# Patient Record
Sex: FEMALE | Race: WHITE | NOT HISPANIC OR LATINO | Employment: FULL TIME | URBAN - METROPOLITAN AREA
[De-identification: names, ages, dates, MRNs, and addresses within clinical notes are randomized per-mention and may not be internally consistent; named-entity substitution may affect disease eponyms.]

---

## 2023-12-13 NOTE — PROGRESS NOTES
INITIAL EVALUATION       HISTORY OF PRESENT ILLNESS:   Marissa Salas is a 33 y.o. female who presents as a new patient with pelvic pain. She has been diagnosed with endometriosis in the past. She has had multiple surgeries with Dr. Muñoz and does not want to see him again.    She is hoping to schedule surgery with Dr. Harris but they would not schedule her until she saw us.     She is not able to take OCP as they were giving her migraines. She has not yet tried any other type of hormonal medication to suppress the endometriosis.     She has not tried trigger point injections yet for her pelvic pain or pain with sex. She describes the pain as stabbing in nature. Deep penetration is the worst feeling for her. She does not have pain with insertion. She still feels this even after her hysterectomy. She is still able to stay aroused despite knowing there may be pain.     PAST MEDICAL HISTORY:  No past medical history on file.    PAST SURGICAL HISTORY:  No past surgical history on file.     ALLERGIES:   Not on File     MEDICATIONS:   [unfilled]     SOCIAL HISTORY:  Patient     Social History     Socioeconomic History    Marital status:      Spouse name: Not on file    Number of children: Not on file    Years of education: Not on file    Highest education level: Not on file   Occupational History    Not on file   Tobacco Use    Smoking status: Not on file    Smokeless tobacco: Not on file   Substance and Sexual Activity    Alcohol use: Not on file    Drug use: Not on file    Sexual activity: Not on file   Other Topics Concern    Not on file   Social History Narrative    Not on file     Social Determinants of Health     Financial Resource Strain: Not on file   Food Insecurity: Not on file   Transportation Needs: Not on file   Physical Activity: Not on file   Stress: Not on file   Social Connections: Not on file   Intimate Partner Violence: Not on file   Housing Stability: Not on file       FAMILY HISTORY:  No  family history on file.    REVIEW OF SYSTEMS:  Constitutional: Negative for fever and chills. Denies anorexia, weight loss.  Eyes: Negative for visual disturbance.   Respiratory: Negative for shortness of breath.    Cardiovascular: Negative for chest pain.   Gastrointestinal: Negative for nausea and vomiting.   Genitourinary: See interval history above.  Skin: Negative for rash.   Neurological: Negative for dizziness and numbness.   Psychiatric/Behavioral: Negative for confusion and decreased concentration.     PHYSICAL EXAM:  External female genitalia is normal    There are no lesions on vulva, labia major or the labia minora     The clitoral prepuce is normal     The urethra is also normal     Speculum exam done gently and cervix visualized, no friable tissue, cysts/lesions noted     Pelvic muscles are not tense overall. There is some tightness on the right side but no tenderness.     Assessment:      Marissa Salas is a 33 y.o. female with endometriosis    We will get her referred to Dr. Castellano who is sub specialized in [facility] for endometriosis.     We will follow up with her annually, or sooner if needed.      Plan:   -Prescribed Aygestin 2.5mg  -Prescribed Tens unit for endometrial pain per her request  -Referral to Dr. Castellano   -Recommended she and her  try the OhNut    Follow up in 1 year    Scribe Attestation  By signing my name below, IYuridia Scribe   attest that this documentation has been prepared under the direction and in the presence of Prema Raphael MD MPH.

## 2023-12-14 ENCOUNTER — OFFICE VISIT (OUTPATIENT)
Dept: UROLOGY | Facility: CLINIC | Age: 33
End: 2023-12-14
Payer: COMMERCIAL

## 2023-12-14 VITALS
HEIGHT: 66 IN | HEART RATE: 84 BPM | WEIGHT: 138.4 LBS | TEMPERATURE: 98 F | DIASTOLIC BLOOD PRESSURE: 79 MMHG | SYSTOLIC BLOOD PRESSURE: 120 MMHG | BODY MASS INDEX: 22.24 KG/M2

## 2023-12-14 DIAGNOSIS — N80.9 ENDOMETRIOSIS: ICD-10-CM

## 2023-12-14 PROCEDURE — 99205 OFFICE O/P NEW HI 60 MIN: CPT | Performed by: OBSTETRICS & GYNECOLOGY

## 2023-12-14 RX ORDER — NORETHINDRONE 5 MG/1
2.5 TABLET ORAL DAILY
Qty: 30 TABLET | Refills: 3 | Status: SHIPPED | OUTPATIENT
Start: 2023-12-14 | End: 2024-08-10

## 2023-12-14 RX ORDER — PRUCALOPRIDE 2 MG/1
2 TABLET, FILM COATED ORAL DAILY
COMMUNITY

## 2023-12-14 RX ORDER — METHYLPHENIDATE HYDROCHLORIDE 54 MG/1
54 TABLET ORAL EVERY MORNING
COMMUNITY

## 2024-02-27 ENCOUNTER — APPOINTMENT (OUTPATIENT)
Dept: OBSTETRICS AND GYNECOLOGY | Facility: CLINIC | Age: 34
End: 2024-02-27
Payer: COMMERCIAL

## 2024-03-05 ENCOUNTER — OFFICE VISIT (OUTPATIENT)
Dept: OBSTETRICS AND GYNECOLOGY | Facility: CLINIC | Age: 34
End: 2024-03-05
Payer: COMMERCIAL

## 2024-03-05 VITALS
BODY MASS INDEX: 21.97 KG/M2 | SYSTOLIC BLOOD PRESSURE: 121 MMHG | HEIGHT: 67 IN | WEIGHT: 140 LBS | DIASTOLIC BLOOD PRESSURE: 83 MMHG | HEART RATE: 84 BPM

## 2024-03-05 DIAGNOSIS — R10.84 GENERALIZED ABDOMINAL PAIN: ICD-10-CM

## 2024-03-05 DIAGNOSIS — K59.00 CONSTIPATION, UNSPECIFIED CONSTIPATION TYPE: Primary | ICD-10-CM

## 2024-03-05 DIAGNOSIS — N80.9 ENDOMETRIOSIS: ICD-10-CM

## 2024-03-05 DIAGNOSIS — Z90.710 HISTORY OF HYSTERECTOMY: ICD-10-CM

## 2024-03-05 PROCEDURE — 99215 OFFICE O/P EST HI 40 MIN: CPT | Performed by: STUDENT IN AN ORGANIZED HEALTH CARE EDUCATION/TRAINING PROGRAM

## 2024-03-05 PROCEDURE — 99205 OFFICE O/P NEW HI 60 MIN: CPT | Performed by: STUDENT IN AN ORGANIZED HEALTH CARE EDUCATION/TRAINING PROGRAM

## 2024-03-05 PROCEDURE — 1036F TOBACCO NON-USER: CPT | Performed by: STUDENT IN AN ORGANIZED HEALTH CARE EDUCATION/TRAINING PROGRAM

## 2024-03-05 ASSESSMENT — PAIN SCALES - GENERAL: PAINLEVEL: 0-NO PAIN

## 2024-03-05 NOTE — PROGRESS NOTES
Division of Minimally Invasive Gynecologic Surgery  [facility]    24 Gynecology Consult     HISTORY OF PRESENT ILLNESS:  Marissa Salas 33 y.o. P2 ( x2) s/p TRH-BS referred by Dr. Raphael for endometriosis and pelvic pain. She does also have a history of interstitial cystitis.     Marissa has a long history of GYN issues and severe dysmenorrhea, beginning with menarche. 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 Menses continued to be debilitating, sometimes so severe w/ pain and vomiting that she had to be admitted to the hospital.     She underwent her first diagnostic laparoscopy w/ Dr. Muñoz in . Per pt, no biopsies were taken, but fulguration was performed. She was told that she had endometriosis based on visualized lesions.     In the following years, she underwent two further laparoscopic surgeries w/ Dr. Muñoz. To the best of her knowledge, no biopsies were taken w/ these either. I cannot find pathologic confirmation of biopsies on chart review, but these may not be accessible to me given time course.     In addition to being painful, menses were also heavy, and she underwent TRH-BS in 2019 (cuff closed w/ 0-Stratafix per op note). Pathology from this was benign w/o evidence of endometriosis (but no peritoneal specimens). There was no visible evidence of endometriosis of the bowel at this time per op report. Of note, procedure was complicated by vaginal cuff dehiscence following intercourse at 3 months post op, with excessive bleeding. Laparoscopic cuff repair was performed.     The same year she had a cystoscopic evaluation by a Urogynecologist and was diagnosed w/ interstitial cystitis based on increased vascularity and mild trabeculations.    Currently, her primary symptoms are related to her bowels. She does still have cyclic pain/cramping similar to menses, but reports this is manageable. She also reports deep  dyspareunia. Bowel movements are complicated by fluctuation between constipation and diarrhea. She reports she has struggled w/ severe constipation for several years, severe enough to cause feculent emesis. She has been evaluated for this by GI at [facility] previously. Work up included colonoscopy (last in 2019) which was negative to the best of her knowledge. She has tried several medications, including Linzess, dicyclomine, BID Miralax, and suppositories. She has never been able to establish a bowel movement pattern approaching normal. Her constipation does cause severe pain symptoms. To date, the best diagnoses she has received are IBS-C/D versus possible bowel endometriosis.     She has tried several medications for suppression of endometriosis, including DOYLE's, progesterone IUD, NuvaRing, and most recently Aygestin 2.5mg. DOYLE's and Aygestin caused significant headache/migraines. NuvaRing she did tolerate reasonably well, but had great difficulty keeping the ring in place.     She has also had a spinal-access pudendal nerve block, but reports this was ineffective. She has utilized a TENS unit w/ some success. She tried gabapentin as well, but had no change in pain. She has not tried PFPT, but is open to this. She has never had pelvic floor TPI or Botox.     Screening:   - Last pap early 2019 (HPV negative per chart, cannot see cytology but per pt WNL), no hx of CIN2-3  PMHx: ADHD, anxiety/depression, asthma, hx of cervical spine fracture, hx of alcohol use disorder (sober since 2013)   PSHx: diagnostic laparoscopy x 3, laparoscopic BTL, TRH-BS (no endometriosis found at this time) followed by laparoscopic cuff revision, C5-C6 cervical disc arthroplasty, laparoscopic cholecystectomy    PAST MEDICAL HISTORY:  Past Medical History:   Diagnosis Date    ADHD     Alcohol use disorder in remission     sober since 2013    Anxiety and depression     Asthma     Cervical spine fracture (CMS/HCC)     IBS (irritable bowel  "syndrome)     severe constipation, intermittent diarrhea     PAST SURGICAL HISTORY:  Past Surgical History:   Procedure Laterality Date    CERVICAL DISC ARTHROPLASTY      C5-C6    CHOLECYSTECTOMY      laparoscopic    CYSTOSCOPY      w/ hydrodistention    LAPAROSCOPY DIAGNOSTIC / BIOPSY / ASPIRATION / LYSIS      x3, w/ fulguration of endometriosis    LAPAROSCOPY DIAGNOSTIC / BIOPSY / ASPIRATION / LYSIS  2019    w/ revision of vaginal cuff    ROBOTIC ASSISTED HYSTERECTOMY  2019    w/ BS    SALPINGECTOMY Bilateral     laparoscopic     PHYSICAL EXAMINATION:  VITAL SIGNS: /83   Pulse 84   Ht 1.702 m (5' 7\")   Wt 63.5 kg (140 lb)   BMI 21.93 kg/m²      Constitutional:  No acute distress, well-nourished and well-developed  HEENT: EOM grossly intact, MMM, neck supple and with full ROM  Pulm:  Effort normal. No accessory muscle usage.  No respiratory distress.  Neurological:  She is alert and oriented to person place and time.  Skin: Warm, no pallor.  Psychiatric:  She has normal mood and affect.    ASSESSMENT:  Marissa Salas 33 y.o.P2 ( x2) s/p TRH-BS referred by Dr. Raphael for endometriosis and pelvic pain. She does also have a history of interstitial cystitis.   - We discussed today the multiple etiologies of chronic pelvic pain, including endometriosis, GI etiologies, MSK etiologies, and centralization of pain. Marissa's symptoms are primarily GI-related, but is unclear to me whether endometriosis is contributing to her bowel dysfunction. As she has never had aggressive excision of endometriosis, this is still a possibility.   - We discussed the role of surgical excision in the management of endometriosis and the estimated 15-20% recurrence rate in the future. We discussed the natural history of endometriosis and the fact that hormonal suppression is thought to have a role in slowing disease progression and managing symptoms of endometriosis, but cannot definitively reverse or eliminate endometriosis.   - We " discussed medical management options which she has not tried, including Orilissa and Myfembree. She is open to a trial of Myfembree. Should her bowel function improve noticeably w/ Myfembree, this would be more suggestive of endometriosis.   - She does desire aggressive surgical evaluation for endometriosis and I do think this is reasonable given her history.We did however discuss that given the likelihood of pelvic floor dysfunction and the possibility of centralization of pain and other GI issues, she may not experience complete relief from surgery. She is aware she may require further treatment for these etiologies of pain after surgery.   - Given possibility of advanced bowel disease and her goal of aggressive management, I recommend referral to CRS prior to scheduling of surgery.   - I recommend preoperative MRI to evaluate for the presence of deep infiltrating endometriosis, with particular attention to bowel endometriosis.    PLAN:  - Trial of Myfembree, coupon information shared today  - MRI Pelvis for surgical planning  - Referral placed to CRS, Dr. Benitez  - RTC after CRS evaluation for possible surgical planning     Yuridia Bond MD  03/05/24  10:45 AM

## 2024-03-19 NOTE — PROGRESS NOTES
Chief Complaint: enodmetriosis      History Of Present Illness  Marissa Salas is a 33 y.o. female with history of suspected/visualized but not biopsy proven endometriosis and pelvic pain. She has a history of interstitial cystitis as well as a long history of GYN issues and severe dysmenorrhea, beginning with menarche. 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 Menses continued to be debilitating, sometimes so severe w/ pain and vomiting that she had to be admitted to the hospital.      She underwent her first diagnostic laparoscopy with Dr. Muñoz in 2009/2010. Fulguration was performed but no biopsies. She underwent two further laparoscopic surgeries with Dr. Muñoz. To the best of her knowledge, no biopsies were taken in either case. In April 2019 she underwent a total radical hysterectomy. In July 2019 she underwent diagnostic laparoscopy, LU, and vaginal cuff closure.   Dr. Bnod discussed medical management options which she has not tried, including Orilissa and Myfembree. Patient was open to trial of Myfembree. Should her bowel function improve noticeably with Myfembree, this would be more suggestive of endometriosis. Patient desires aggressive surgical evaluation for endometriosis which Dr. Bond agrees with due to history.     She was evaluated by GI at [facility], Dr. Frederick Bonilla on 4/17/2020. Patient was using Motegrity with success for constipation at that time. Per documentation from GI her last Colonoscopy was unremarkable in May 2018.     Subjective   Marissa Salas has been referred here to our office for discussion of possible bowel involvement in endometriosis.     Today Marissa states she has not started the medications due to side-effect concerns and cost. We discussed MRI results do not look like the bowel is involved but we discussed that we will be available for the case should they need us.     Patient states she has terrible bowel symptoms. She  states she has a tens unit that helps. She takes Motegrity and her ADHD medication helps as well. She states she barely has BM's and has a lot of abdominal pain. Last Colonoscopy was 5 years ago, no signs of bowel endometriosis. Discussed that another Colonoscopy could be performed prior to surgery if patient is amenable to that.      3/22/24 - MRI Pelvis IMPRESSION:    Colonoscopy: May 2018 ([facility])    Past Medical History  She has a past medical history of ADHD, Alcohol use disorder in remission, Anxiety and depression, Asthma, Cervical spine fracture (CMS/HCC), and IBS (irritable bowel syndrome).    Surgical History  She has a past surgical history that includes Laparoscopy diagnostic / biopsy / aspiration / lysis; Salpingectomy (Bilateral); Cholecystectomy; Cervical disc arthroplasty; Robotic assisted hysterectomy (2019); Laparoscopy diagnostic / biopsy / aspiration / lysis (2019); and Cystoscopy.     Social History  She reports that she has never smoked. She has never used smokeless tobacco. Alcohol use questions deferred to the physician. Drug use questions deferred to the physician.    Family History  No family history on file.     Allergies  Ciprofloxacin, Sudafed [pseudoephedrine hcl], Haldol [haloperidol], Linzess [linaclotide], Promethazine, Toradol [ketorolac], and Vyvanse [lisdexamfetamine]    Home Medications  Prior to Admission medications    Medication Sig Start Date End Date Taking? Authorizing Provider   methylphenidate ER (Concerta) 54 mg ER tablet Take 1 tablet (54 mg) by mouth once daily in the morning. Do not crush, chew, or split.    Historical Provider, MD   norethindrone (Aygestin) 5 mg tablet Take 0.5 tablets (2.5 mg) by mouth once daily. 12/14/23 8/10/24  Prema Raphael MD MPH   prucalopride (Motegrity) 2 mg tablet Take 1 tablet (2 mg) by mouth once daily.    Historical Provider, MD   relugolix-estradiol-norethindr 40-1-0.5 mg tablet Take 1 tablet by mouth once daily. 3/5/24   Yuridia NEAL  MD Varun         Review of Systems   Gastrointestinal:  Positive for abdominal distention, abdominal pain and constipation. Negative for anal bleeding, blood in stool, diarrhea, nausea, rectal pain and vomiting.   All other systems reviewed and are negative.        Imaging:  MR pelvis w and wo IV contrast    Result Date: 3/25/2024  Interpreted By:  Cal Velasquez, STUDY: MR PELVIS W AND WO IV CONTRAST;  3/22/2024 6:16 pm   INDICATION: Signs/Symptoms:Endometriosis protocol.   COMPARISON: None.   ACCESSION NUMBER(S): [id number]   ORDERING CLINICIAN: ALICE YEH   TECHNIQUE: Multiplanar MRI of the pelvis was obtained including axial, sagittal and coronal T2 weighted SSFSE, (para)axial, (para)coronal and sagittal T2 FSE , axial DWI, pre and post gadolinium dynamic T1 GRE sequences in 3 planes. 13 ml of Gadolinium contrast agent Dotarem were administered intravenously without complication.   FINDINGS: UTERUS: Status post robotic assisted hysterectomy with no gross soft tissue mass at the surgical bed.   OVARIES/ADNEXA: The right ovary is not properly visualized. The left ovary measures 1.7 cm (series [id number], image 6)   Right pelvic well-defined lesions likely ovarian origin: Largest measures 4.5 x 4.0 cm (series [id number], image 15) and the adjacent measures 2.1 x 1.9 cm (series [id number], image 13) : The lesions are predominantly T2 hyperintense with dependent foci of T2 hypointense signal (presumed T2 shading). The larger lesion shows dependent T1 hyperintense foci in T1 fat-sat precontrast sequence (series [id number], image 54) Trace peripheral smooth wall enhancement but no intrinsic solid enhancing components.   PERITONEAL FLUID: Small volume pelvic free fluid.   PELVIC LYMPH NODES: No abnormally enlarged pelvic lymph nodes are identified.   BOWEL: No bowel dilatation   BONES: No focal lesions are noted in the bone. Left anterior acetabulum/inferior iliac bone T2 hyperintense patchy enhancing focus, likely benign.    "    Patient is status post robotic assisted hysterectomy and bilateral salpingectomy. Postcontrast images are slightly degraded by motion artifact. No old films available for comparison. 1. Right pelvic likely ovarian 2 well-defined heterogenous lesions, measuring 4.5 x 4.0 cm and 2.1 x 1.9 cm with imaging characteristics (presumed T2 shading) coupled with the patient's known history would favor endometriomas , alternatively (less likely) could represent hemorrhagic cysts. Trace peripheral smooth wall enhancement but no intrinsic suspicious enhancing solid components. Follow-up in 8 weeks by ultrasound is recommended to re-evaluate stability (favoring endometrium) or resolution (favoring hemorrhagic cysts). 2. Small volume pelvic free fluid. 3. Left anterior acetabulum/inferior iliac bone patchy enhancing osseous focus, likely benign.   Recommendation: Follow-up pelvic ultrasound in 8 weeks.   MACRO: Critical Finding:  See findings. Notification was initiated on 3/25/2024 at 10:15 am by  Cal Velasquez.  (**-YCF-**) Instructions:   Signed by: Cal Velasquez 3/25/2024 10:19 AM Dictation workstation:   [id number]        Labs:   No results found for: \"LABBILI\", \"BILIDIR\", \"AST\", \"ALT\", \"ALKPHOS\", \"PROT\", \"ALBUMIN\", \"AMYLASE\", \"CEA\"       Physical Exam  Vitals reviewed.   HENT:      Head: Normocephalic.   Eyes:      Extraocular Movements: Extraocular movements intact.   Cardiovascular:      Rate and Rhythm: Normal rate.   Pulmonary:      Effort: Pulmonary effort is normal.   Abdominal:      General: There is no distension.      Palpations: Abdomen is soft. There is no mass.      Tenderness: There is no abdominal tenderness.      Hernia: No hernia is present.   Musculoskeletal:         General: Normal range of motion.      Cervical back: Normal range of motion.   Skin:     General: Skin is warm and dry.   Neurological:      General: No focal deficit present.      Mental Status: She is alert.   Psychiatric:         Mood and " "Affect: Mood normal.            Last Recorded Vitals  Blood pressure 124/81, pulse 79, temperature 36.4 °C (97.5 °F), temperature source Temporal, height 1.702 m (5' 7\"), weight 64 kg (141 lb 3.2 oz), SpO2 99 %.      Assessment/Plan     33F with endometriosis without evidence of bowel involvement on available workup.     We recommend full colonoscopy preop.     Discussed that we recommend full bowel prep prior to surgery in the event that the bowel does become part of the surgery.     Discussed establishing care with GI Motility Specialist, we will make STAT referral to Dr. Ocampo.     We will be available at the time of her operation with Dr. Bond in case of bowel involvement.    I spent a total of 60 min on the care of this patient.      Dr. Leah Benitez  4/7/2024    "

## 2024-03-22 ENCOUNTER — HOSPITAL ENCOUNTER (OUTPATIENT)
Dept: RADIOLOGY | Facility: HOSPITAL | Age: 34
Discharge: HOME | End: 2024-03-22
Payer: COMMERCIAL

## 2024-03-22 DIAGNOSIS — N80.9 ENDOMETRIOSIS: ICD-10-CM

## 2024-03-22 PROCEDURE — A9575 INJ GADOTERATE MEGLUMI 0.1ML: HCPCS | Performed by: STUDENT IN AN ORGANIZED HEALTH CARE EDUCATION/TRAINING PROGRAM

## 2024-03-22 PROCEDURE — 72197 MRI PELVIS W/O & W/DYE: CPT

## 2024-03-22 PROCEDURE — 72197 MRI PELVIS W/O & W/DYE: CPT | Performed by: STUDENT IN AN ORGANIZED HEALTH CARE EDUCATION/TRAINING PROGRAM

## 2024-03-22 PROCEDURE — 2550000001 HC RX 255 CONTRASTS: Performed by: STUDENT IN AN ORGANIZED HEALTH CARE EDUCATION/TRAINING PROGRAM

## 2024-03-22 RX ORDER — GADOTERATE MEGLUMINE 376.9 MG/ML
13 INJECTION INTRAVENOUS
Status: COMPLETED | OUTPATIENT
Start: 2024-03-22 | End: 2024-03-22

## 2024-03-22 RX ADMIN — GADOTERATE MEGLUMINE 13 ML: 376.9 INJECTION INTRAVENOUS at 18:03

## 2024-03-29 ENCOUNTER — TELEPHONE (OUTPATIENT)
Dept: OBSTETRICS AND GYNECOLOGY | Facility: CLINIC | Age: 34
End: 2024-03-29

## 2024-03-29 ENCOUNTER — OFFICE VISIT (OUTPATIENT)
Dept: SURGERY | Facility: CLINIC | Age: 34
End: 2024-03-29
Payer: COMMERCIAL

## 2024-03-29 VITALS
WEIGHT: 141.2 LBS | HEIGHT: 67 IN | DIASTOLIC BLOOD PRESSURE: 81 MMHG | OXYGEN SATURATION: 99 % | HEART RATE: 79 BPM | BODY MASS INDEX: 22.16 KG/M2 | SYSTOLIC BLOOD PRESSURE: 124 MMHG | TEMPERATURE: 97.5 F

## 2024-03-29 DIAGNOSIS — N80.9 ENDOMETRIOSIS: ICD-10-CM

## 2024-03-29 DIAGNOSIS — K59.00 CONSTIPATION, UNSPECIFIED CONSTIPATION TYPE: ICD-10-CM

## 2024-03-29 PROCEDURE — 99205 OFFICE O/P NEW HI 60 MIN: CPT | Performed by: SURGERY

## 2024-03-29 ASSESSMENT — PATIENT HEALTH QUESTIONNAIRE - PHQ9
1. LITTLE INTEREST OR PLEASURE IN DOING THINGS: NOT AT ALL
SUM OF ALL RESPONSES TO PHQ9 QUESTIONS 1 & 2: 0
2. FEELING DOWN, DEPRESSED OR HOPELESS: NOT AT ALL

## 2024-03-29 ASSESSMENT — PAIN SCALES - GENERAL: PAINLEVEL: 0-NO PAIN

## 2024-03-29 ASSESSMENT — ENCOUNTER SYMPTOMS
OCCASIONAL FEELINGS OF UNSTEADINESS: 0
LOSS OF SENSATION IN FEET: 0
DEPRESSION: 0

## 2024-03-29 NOTE — TELEPHONE ENCOUNTER
Patient called in for guidance after her pelvic MRI she recently had. Patient states that she was advised that next steps would be an ultrasound, but doesn't know for sure.

## 2024-03-29 NOTE — TELEPHONE ENCOUNTER
Patient wants to know what next steps are following MRI.    She did see Dr. Brwon yesterday.  Will check with Dr. Bond.

## 2024-04-07 ASSESSMENT — ENCOUNTER SYMPTOMS
ABDOMINAL PAIN: 1
RECTAL PAIN: 0
DIARRHEA: 0
BLOOD IN STOOL: 0
VOMITING: 0
CONSTIPATION: 1
NAUSEA: 0
ANAL BLEEDING: 0
ABDOMINAL DISTENTION: 1

## 2024-05-01 NOTE — PROGRESS NOTES
"Division of Minimally Invasive Gynecologic Surgery  [facility]    24 Gynecology Visit    CC: Follow Up Endometriosis     Marissa Salas is a 33 y.o. P2 ( x2) s/p TRH-BS referred by Dr. Raphael for endometriosis and pelvic pain. She does also have a history of interstitial cystitis. Presents in follow up today for surgical planning. Of note, she did have a vaginal cuff dehiscence following intercourse at 3 months post op. At last visit, she was started on a trial of Myfembree, MRI was ordered, and she was referred to Dr. Benitez given significant GI symptoms. Dr. Benitez recommended colonoscopy, referral to GI motility specialist (Dr. Ocampo), and bowel prep prior to excisional surgery. She will be available at time of surgery should bowel resection be required.     Recent MRI pelvis showed likely endometriomas x 2 measuring 5cm x 4cm and 2cm x 2cm.     Screening:   - Last pap early  (HPV negative per chart, cannot see cytology but per pt WNL), no hx of CIN2-3  PMHx: ADHD, anxiety/depression, asthma, hx of cervical spine fracture, hx of alcohol use disorder (sober since )   PSHx: diagnostic laparoscopy x 3, laparoscopic BTL, TRH-BS (no endometriosis found at this time) followed by laparoscopic cuff revision, C5-C6 cervical disc arthroplasty, laparoscopic cholecystectomy    PMHx, PSHx, SHx, Allergies, and Medications updated in Epic.    ROS: reviewed and negative    PE: /81   Pulse 83   Ht 1.702 m (5' 7\")   Wt 62.1 kg (137 lb)   BMI 21.46 kg/m²    Constitutional:  No acute distress, well-nourished and well-developed  HEENT: EOM grossly intact, MMM, neck supple and with full ROM  Pulm:  Effort normal. No accessory muscle usage.  No respiratory distress.  Neurological:  She is alert and oriented to person place and time.  Skin: Warm, no pallor.  Psychiatric:  She has normal mood and affect.    A/P: Marissa Salas is a 33 y.o. P2 2 ( x2) s/p " TRH-BS referred by Dr. Raphael for endometriosis and pelvic pain, presents in follow up after meeting with Dr. Benitez to discuss surgical planning.     We reviewed the risks/benefits/alternatives to surgery. She is aware of the risk of bleeding, infection, and damage to nearby structures, including reproductive structures, bladder, ureters, bowel, and vasculature. She is agreeable to blood transfusion if recommended. We reviewed the possibility of appendectomy, as well as the administration of antibiotics if appendectomy is performed. She understands and is in agreement with this plan.    She is comfortable w/ both bowel and bladder resection if needed, but would prefer to avoid opening of vaginal cuff if at all possible. She does not want diaphragmatic resection. If she requires post operative catheter, she will need antibiotic suppression.      Plan:  - Schedule for robotic excision of endometriosis, possible appendectomy, possible bowel resection, any indicated procedure. PAT: Yes. Main only  - Will coordinate w/ Dr. Emmanuel Bond MD  Division of Minimally Invasive Gynecologic Surgery  [facility]

## 2024-05-02 ENCOUNTER — OFFICE VISIT (OUTPATIENT)
Dept: OBSTETRICS AND GYNECOLOGY | Facility: CLINIC | Age: 34
End: 2024-05-02
Payer: COMMERCIAL

## 2024-05-02 VITALS
HEIGHT: 67 IN | DIASTOLIC BLOOD PRESSURE: 81 MMHG | WEIGHT: 137 LBS | SYSTOLIC BLOOD PRESSURE: 119 MMHG | HEART RATE: 83 BPM | BODY MASS INDEX: 21.5 KG/M2

## 2024-05-02 DIAGNOSIS — N80.9 ENDOMETRIOSIS: ICD-10-CM

## 2024-05-02 DIAGNOSIS — Z01.818 PREOPERATIVE CLEARANCE: Primary | ICD-10-CM

## 2024-05-02 PROCEDURE — 99215 OFFICE O/P EST HI 40 MIN: CPT | Performed by: STUDENT IN AN ORGANIZED HEALTH CARE EDUCATION/TRAINING PROGRAM

## 2024-05-02 ASSESSMENT — ENCOUNTER SYMPTOMS
ENDOCRINE NEGATIVE: 0
GASTROINTESTINAL NEGATIVE: 0
CARDIOVASCULAR NEGATIVE: 0
EYES NEGATIVE: 0
NEUROLOGICAL NEGATIVE: 0
MUSCULOSKELETAL NEGATIVE: 0
HEMATOLOGIC/LYMPHATIC NEGATIVE: 0
CONSTITUTIONAL NEGATIVE: 0
ALLERGIC/IMMUNOLOGIC NEGATIVE: 0
RESPIRATORY NEGATIVE: 0
PSYCHIATRIC NEGATIVE: 0

## 2024-05-02 ASSESSMENT — PAIN SCALES - GENERAL: PAINLEVEL: 0-NO PAIN

## 2024-05-08 RX ORDER — NEOMYCIN SULFATE 500 MG/1
TABLET ORAL
Qty: 6 TABLET | Refills: 0 | Status: SHIPPED | OUTPATIENT
Start: 2024-05-08

## 2024-05-08 RX ORDER — ACETAMINOPHEN 325 MG/1
975 TABLET ORAL ONCE
OUTPATIENT
Start: 2024-05-08 | End: 2024-05-08

## 2024-05-08 RX ORDER — CELECOXIB 400 MG/1
400 CAPSULE ORAL ONCE
OUTPATIENT
Start: 2024-05-08 | End: 2024-05-08

## 2024-05-08 RX ORDER — GABAPENTIN 600 MG/1
600 TABLET ORAL ONCE
OUTPATIENT
Start: 2024-05-08 | End: 2024-05-08

## 2024-05-08 RX ORDER — METRONIDAZOLE 250 MG/1
TABLET ORAL
Qty: 3 TABLET | Refills: 0 | Status: SHIPPED | OUTPATIENT
Start: 2024-05-08

## 2024-07-17 ENCOUNTER — OFFICE VISIT (OUTPATIENT)
Dept: GASTROENTEROLOGY | Facility: CLINIC | Age: 34
End: 2024-07-17
Payer: COMMERCIAL

## 2024-07-17 VITALS
HEART RATE: 84 BPM | SYSTOLIC BLOOD PRESSURE: 111 MMHG | DIASTOLIC BLOOD PRESSURE: 72 MMHG | WEIGHT: 138.4 LBS | BODY MASS INDEX: 22.24 KG/M2 | TEMPERATURE: 97.7 F | HEIGHT: 66 IN

## 2024-07-17 DIAGNOSIS — N80.9 ENDOMETRIOSIS: ICD-10-CM

## 2024-07-17 DIAGNOSIS — K59.00 CONSTIPATION, UNSPECIFIED CONSTIPATION TYPE: ICD-10-CM

## 2024-07-17 PROCEDURE — 3008F BODY MASS INDEX DOCD: CPT | Performed by: INTERNAL MEDICINE

## 2024-07-17 PROCEDURE — 99203 OFFICE O/P NEW LOW 30 MIN: CPT | Performed by: INTERNAL MEDICINE

## 2024-07-17 PROCEDURE — 99213 OFFICE O/P EST LOW 20 MIN: CPT | Performed by: INTERNAL MEDICINE

## 2024-07-17 RX ORDER — PLECANATIDE 3 MG/1
3 TABLET ORAL DAILY
Qty: 90 TABLET | Refills: 3 | Status: SHIPPED | OUTPATIENT
Start: 2024-07-17 | End: 2025-07-17

## 2024-07-17 RX ORDER — FLUTICASONE FUROATE 27.5 UG/1
2 SPRAY, METERED NASAL
COMMUNITY

## 2024-07-17 ASSESSMENT — PAIN SCALES - GENERAL: PAINLEVEL: 0-NO PAIN

## 2024-07-17 NOTE — PATIENT INSTRUCTIONS
As we discussed we will start you on Trulance 3 mg daily and if not effective or causes side effects you can transition back to Motegrity. We will do a colon transit study to see if there is a segmental problem or the whole colon is sluggish. You will swallow a capsule on a Monday and get plain x-rays on Wednesday and Friday. Continue with plenty of fluids. Follow up in 6-8 weeks.

## 2024-07-17 NOTE — PROGRESS NOTES
History Of Present Illness  Marissa Salas is a 34 y.o. female presenting with chronic constipation.    She has a history of severe endometriosis and has had multiple laparoscopic surgeries as well as a hysterectomy in 2019.  This was done because of chronic pelvic pain.  She also had a normal colonoscopy in 2018.  At this time surgery is being considered for possible endometriosis causing obstructive defecation.  She has been treated in the past with Amitiza which was ineffective as well as Linzess that causes severe dehydration.  She is also taken over-the-counter medications including MiraLAX and Colace without benefit.  Currently she takes Motegrity 2 mg daily which worked well initially but for the past few months has been less effective.  She typically has anywhere from a bowel movement every 3 days to multiple bowel movements per day of varying consistency.  There is a sense of incomplete evacuation.  There is also lower abdominal pain that is better with defecation.  She complains of a lot of flatulence as well.  Weight fluctuates but has been relatively stable.  She has seen blood with hard stools.  She has not had any other workup including sits marker study, manometry or defecography.  Labs have been normal.  Most recent MRI showed 2 heterogeneous lesions in the right pelvis thought to be likely endometriomas.       Past Medical History  Past Medical History:   Diagnosis Date    ADHD     Alcohol use disorder in remission     sober since 2013    Anxiety and depression     Asthma (HHS-HCC)     Cervical spine fracture (Multi)     IBS (irritable bowel syndrome)     severe constipation, intermittent diarrhea       Surgical History  Past Surgical History:   Procedure Laterality Date    CERVICAL DISC ARTHROPLASTY      C5-C6    CHOLECYSTECTOMY      laparoscopic    CYSTOSCOPY      w/ hydrodistention    LAPAROSCOPY DIAGNOSTIC / BIOPSY / ASPIRATION / LYSIS      x3, w/ fulguration of endometriosis    LAPAROSCOPY  "DIAGNOSTIC / BIOPSY / ASPIRATION / LYSIS  2019    w/ revision of vaginal cuff    ROBOTIC ASSISTED HYSTERECTOMY  2019    w/ BS    SALPINGECTOMY Bilateral     laparoscopic        Social History  She reports that she has never smoked. She has never used smokeless tobacco. Alcohol use questions deferred to the physician. Drug use questions deferred to the physician.    Family History  No family history on file.     Allergies  Ciprofloxacin, Sudafed [pseudoephedrine hcl], Benzodiazepines, Haldol [haloperidol], Ibuprofen, Linzess [linaclotide], Promethazine, Toradol [ketorolac], and Vyvanse [lisdexamfetamine]    Last Recorded Vitals  /72   Pulse 84   Temp 36.5 °C (97.7 °F) (Temporal)   Ht 1.676 m (5' 6\")   Wt 62.8 kg (138 lb 6.4 oz)   BMI 22.34 kg/m²        Physical Exam  Vitals reviewed.   Constitutional:       Appearance: Normal appearance.   Cardiovascular:      Rate and Rhythm: Normal rate and regular rhythm.   Pulmonary:      Effort: Pulmonary effort is normal.      Breath sounds: Normal breath sounds.   Abdominal:      General: Abdomen is flat. Bowel sounds are normal.      Palpations: Abdomen is soft. There is no mass.      Tenderness: There is abdominal tenderness.   Neurological:      Mental Status: She is alert.           Labs  No results found for: \"GLUCOSE\", \"CALCIUM\", \"NA\", \"K\", \"CO2\", \"CL\", \"BUN\", \"CREATININE\"  No results found for: \"WBC\", \"HGB\", \"HCT\", \"MCV\", \"PLT\", \"NA\", \"K\", \"CL\", \"CO2\", \"BUN\", \"CREATININE\", \"CALCIUM\", \"PROT\", \"BILITOT\", \"ALKPHOS\", \"ALT\", \"AST\", \"GLUCOSE\", \"CRP\"        Imaging     MR pelvis w and wo IV contrast    Result Date: 3/25/2024  Interpreted By:  Cal Velasquez, STUDY: MR PELVIS W AND WO IV CONTRAST;  3/22/2024 6:16 pm   INDICATION: Signs/Symptoms:Endometriosis protocol.   COMPARISON: None.   ACCESSION NUMBER(S): [id number]   ORDERING CLINICIAN: ALICE YEH   TECHNIQUE: Multiplanar MRI of the pelvis was obtained including axial, sagittal and coronal T2 weighted " SSFSE, (para)axial, (para)coronal and sagittal T2 FSE , axial DWI, pre and post gadolinium dynamic T1 GRE sequences in 3 planes. 13 ml of Gadolinium contrast agent Dotarem were administered intravenously without complication.   FINDINGS: UTERUS: Status post robotic assisted hysterectomy with no gross soft tissue mass at the surgical bed.   OVARIES/ADNEXA: The right ovary is not properly visualized. The left ovary measures 1.7 cm (series [id number], image 6)   Right pelvic well-defined lesions likely ovarian origin: Largest measures 4.5 x 4.0 cm (series [id number], image 15) and the adjacent measures 2.1 x 1.9 cm (series [id number], image 13) : The lesions are predominantly T2 hyperintense with dependent foci of T2 hypointense signal (presumed T2 shading). The larger lesion shows dependent T1 hyperintense foci in T1 fat-sat precontrast sequence (series [id number], image 54) Trace peripheral smooth wall enhancement but no intrinsic solid enhancing components.   PERITONEAL FLUID: Small volume pelvic free fluid.   PELVIC LYMPH NODES: No abnormally enlarged pelvic lymph nodes are identified.   BOWEL: No bowel dilatation   BONES: No focal lesions are noted in the bone. Left anterior acetabulum/inferior iliac bone T2 hyperintense patchy enhancing focus, likely benign.       Patient is status post robotic assisted hysterectomy and bilateral salpingectomy. Postcontrast images are slightly degraded by motion artifact. No old films available for comparison. 1. Right pelvic likely ovarian 2 well-defined heterogenous lesions, measuring 4.5 x 4.0 cm and 2.1 x 1.9 cm with imaging characteristics (presumed T2 shading) coupled with the patient's known history would favor endometriomas , alternatively (less likely) could represent hemorrhagic cysts. Trace peripheral smooth wall enhancement but no intrinsic suspicious enhancing solid components. Follow-up in 8 weeks by ultrasound is recommended to re-evaluate stability (favoring endometrium) or  resolution (favoring hemorrhagic cysts). 2. Small volume pelvic free fluid. 3. Left anterior acetabulum/inferior iliac bone patchy enhancing osseous focus, likely benign.   Recommendation: Follow-up pelvic ultrasound in 8 weeks.   MACRO: Critical Finding:  See findings. Notification was initiated on 3/25/2024 at 10:15 am by  Cal Velasquez.  (**-YCF-**) Instructions:   Signed by: Cal Velasquez 3/25/2024 10:19 AM Dictation workstation:   [id number]    MR pelvis w and wo IV contrast  Narrative: Interpreted By:  Cal Velasquez,   STUDY:  MR PELVIS W AND WO IV CONTRAST;  3/22/2024 6:16 pm      INDICATION:  Signs/Symptoms:Endometriosis protocol.      COMPARISON:  None.      ACCESSION NUMBER(S):  [id number]      ORDERING CLINICIAN:  ALICE YEH      TECHNIQUE:  Multiplanar MRI of the pelvis was obtained including axial, sagittal  and coronal T2 weighted SSFSE, (para)axial, (para)coronal and  sagittal T2 FSE , axial DWI, pre and post gadolinium dynamic T1 GRE  sequences in 3 planes.  13 ml of Gadolinium contrast agent Dotarem were administered  intravenously without complication.      FINDINGS:  UTERUS:  Status post robotic assisted hysterectomy with no gross soft tissue  mass at the surgical bed.      OVARIES/ADNEXA:  The right ovary is not properly visualized. The left ovary measures  1.7 cm (series 113611, image 6)      Right pelvic well-defined lesions likely ovarian origin:  Largest measures 4.5 x 4.0 cm (series 137902, image 15) and the  adjacent measures 2.1 x 1.9 cm (series 299294, image 13) : The  lesions are predominantly T2 hyperintense with dependent foci of T2  hypointense signal (presumed T2 shading). The larger lesion shows  dependent T1 hyperintense foci in T1 fat-sat precontrast sequence  (series [id number], image 54) Trace peripheral smooth wall enhancement  but no intrinsic solid enhancing components.      PERITONEAL FLUID:  Small volume pelvic free fluid.      PELVIC LYMPH NODES:  No abnormally enlarged  pelvic lymph nodes are identified.      BOWEL:  No bowel dilatation      BONES:  No focal lesions are noted in the bone. Left anterior  acetabulum/inferior iliac bone T2 hyperintense patchy enhancing  focus, likely benign.      Impression: Patient is status post robotic assisted hysterectomy and bilateral  salpingectomy. Postcontrast images are slightly degraded by motion  artifact. No old films available for comparison.  1. Right pelvic likely ovarian 2 well-defined heterogenous lesions,  measuring 4.5 x 4.0 cm and 2.1 x 1.9 cm with imaging characteristics  (presumed T2 shading) coupled with the patient's known history would  favor endometriomas , alternatively (less likely) could represent  hemorrhagic cysts. Trace peripheral smooth wall enhancement but no  intrinsic suspicious enhancing solid components. Follow-up in 8 weeks  by ultrasound is recommended to re-evaluate stability (favoring  endometrium) or resolution (favoring hemorrhagic cysts).  2. Small volume pelvic free fluid.  3. Left anterior acetabulum/inferior iliac bone patchy enhancing  osseous focus, likely benign.      Recommendation:  Follow-up pelvic ultrasound in 8 weeks.      MACRO:  Critical Finding:  See findings. Notification was initiated on  3/25/2024 at 10:15 am by  Cal Velasquez.  (**-YCF-**) Instructions:      Signed by: Cal Velasquez 3/25/2024 10:19 AM  Dictation workstation:   [id number]         ASSESSMENT & PLAN  Problem List Items Addressed This Visit    None  Visit Diagnoses         Codes    Endometriosis     N80.9    Constipation, unspecified constipation type     K59.00    Relevant Medications    plecanatide (Trulance) tablet tablet    Other Relevant Orders    XR GI transit colonic study KUB    XR abdomen 1 view          At this point I would recommend evaluation to determine if this is a outlet problem versus slow transit.  She will be scheduled for a sits marker study to help clarify.  As the Motegrity has been only partially  successful we will try her on Trulance 3 mg daily and if this medication is not well-tolerated or ineffective she could go back to [facility].  She will follow-up with me in 6 to 8 weeks time.    I spent 30 minutes in the professional and overall care of this patient.      Bienvenido Ocampo MD

## 2024-07-29 ENCOUNTER — HOSPITAL ENCOUNTER (OUTPATIENT)
Facility: HOSPITAL | Age: 34
Setting detail: OUTPATIENT SURGERY
End: 2024-07-29
Attending: STUDENT IN AN ORGANIZED HEALTH CARE EDUCATION/TRAINING PROGRAM | Admitting: STUDENT IN AN ORGANIZED HEALTH CARE EDUCATION/TRAINING PROGRAM
Payer: COMMERCIAL

## 2024-08-05 ENCOUNTER — SPECIALTY PHARMACY (OUTPATIENT)
Dept: PHARMACY | Facility: CLINIC | Age: 34
End: 2024-08-05

## 2024-08-05 DIAGNOSIS — K59.00 CONSTIPATION, UNSPECIFIED CONSTIPATION TYPE: Primary | ICD-10-CM

## 2024-08-05 RX ORDER — PRUCALOPRIDE 2 MG/1
2 TABLET, FILM COATED ORAL DAILY
Qty: 90 TABLET | Refills: 3 | Status: SHIPPED | OUTPATIENT
Start: 2024-08-05 | End: 2025-08-05

## 2024-08-07 ENCOUNTER — SPECIALTY PHARMACY (OUTPATIENT)
Dept: PHARMACY | Facility: CLINIC | Age: 34
End: 2024-08-07

## 2024-09-04 ENCOUNTER — SPECIALTY PHARMACY (OUTPATIENT)
Dept: PHARMACY | Facility: CLINIC | Age: 34
End: 2024-09-04

## 2024-11-18 ENCOUNTER — SPECIALTY PHARMACY (OUTPATIENT)
Dept: PHARMACY | Facility: CLINIC | Age: 34
End: 2024-11-18

## 2025-01-23 ENCOUNTER — PHARMACY VISIT (OUTPATIENT)
Dept: PHARMACY | Facility: CLINIC | Age: 35
End: 2025-01-23
Payer: COMMERCIAL

## 2025-01-23 PROCEDURE — RXMED WILLOW AMBULATORY MEDICATION CHARGE

## 2025-01-23 RX ORDER — METHYLPHENIDATE HYDROCHLORIDE 54 MG/1
54 TABLET ORAL EVERY MORNING
Qty: 30 TABLET | Refills: 0 | OUTPATIENT
Start: 2024-12-11

## 2025-02-27 DIAGNOSIS — K59.00 CONSTIPATION, UNSPECIFIED CONSTIPATION TYPE: ICD-10-CM

## 2025-02-27 RX ORDER — PRUCALOPRIDE 2 MG/1
2 TABLET ORAL DAILY
Qty: 90 TABLET | Refills: 3 | Status: SHIPPED | OUTPATIENT
Start: 2025-02-27 | End: 2026-02-27

## 2025-02-27 RX ORDER — PRUCALOPRIDE 2 MG/1
2 TABLET ORAL DAILY
Qty: 90 TABLET | Refills: 3 | Status: SHIPPED | OUTPATIENT
Start: 2025-02-27 | End: 2025-02-27 | Stop reason: SDUPTHER

## 2025-03-06 ENCOUNTER — PHARMACY VISIT (OUTPATIENT)
Dept: PHARMACY | Facility: CLINIC | Age: 35
End: 2025-03-06
Payer: COMMERCIAL

## 2025-03-06 PROCEDURE — RXMED WILLOW AMBULATORY MEDICATION CHARGE

## 2025-03-06 RX ORDER — METHYLPHENIDATE HYDROCHLORIDE 54 MG/1
54 TABLET ORAL EVERY MORNING
Qty: 30 TABLET | Refills: 0 | OUTPATIENT
Start: 2025-02-09

## 2025-04-05 PROCEDURE — RXMED WILLOW AMBULATORY MEDICATION CHARGE

## 2025-04-10 ENCOUNTER — PHARMACY VISIT (OUTPATIENT)
Dept: PHARMACY | Facility: CLINIC | Age: 35
End: 2025-04-10
Payer: COMMERCIAL